# Patient Record
Sex: FEMALE | Race: BLACK OR AFRICAN AMERICAN | Employment: FULL TIME | URBAN - METROPOLITAN AREA
[De-identification: names, ages, dates, MRNs, and addresses within clinical notes are randomized per-mention and may not be internally consistent; named-entity substitution may affect disease eponyms.]

---

## 2019-07-07 ENCOUNTER — HOSPITAL ENCOUNTER (EMERGENCY)
Age: 19
Discharge: HOME | End: 2019-07-07
Payer: COMMERCIAL

## 2019-07-07 VITALS
HEART RATE: 73 BPM | BODY MASS INDEX: 30.52 KG/M2 | SYSTOLIC BLOOD PRESSURE: 134 MMHG | DIASTOLIC BLOOD PRESSURE: 66 MMHG | HEIGHT: 67 IN | TEMPERATURE: 98.8 F | RESPIRATION RATE: 16 BRPM | WEIGHT: 194.45 LBS | OXYGEN SATURATION: 98 %

## 2019-07-07 DIAGNOSIS — B96.89 BV (BACTERIAL VAGINOSIS): Primary | ICD-10-CM

## 2019-07-07 DIAGNOSIS — N76.0 BV (BACTERIAL VAGINOSIS): Primary | ICD-10-CM

## 2019-07-07 LAB
BACTERIA WET PREP: ABNORMAL
BACTERIA: ABNORMAL /HPF
BILIRUBIN URINE: NEGATIVE
BLOOD, URINE: NEGATIVE
CLARITY: ABNORMAL
CLUE CELLS: ABNORMAL
COLOR: YELLOW
EPITHELIAL CELLS WET PREP: ABNORMAL
EPITHELIAL CELLS, UA: 21 /HPF (ref 0–5)
GLUCOSE URINE: NEGATIVE MG/DL
HYALINE CASTS: 1 /LPF (ref 0–8)
KETONES, URINE: NEGATIVE MG/DL
LEUKOCYTE ESTERASE, URINE: NEGATIVE
MICROSCOPIC EXAMINATION: YES
NITRITE, URINE: NEGATIVE
PH UA: 7 (ref 5–8)
PROTEIN UA: NEGATIVE MG/DL
RBC UA: 1 /HPF (ref 0–4)
RBC WET PREP: ABNORMAL
SOURCE WET PREP: ABNORMAL
SPECIFIC GRAVITY UA: 1.03 (ref 1–1.03)
TRICHOMONAS PREP: ABNORMAL
URINE REFLEX TO CULTURE: YES
URINE TYPE: ABNORMAL
UROBILINOGEN, URINE: 0.2 E.U./DL
WBC UA: 10 /HPF (ref 0–5)
WBC WET PREP: ABNORMAL
YEAST WET PREP: ABNORMAL

## 2019-07-07 PROCEDURE — 87210 SMEAR WET MOUNT SALINE/INK: CPT

## 2019-07-07 PROCEDURE — 99283 EMERGENCY DEPT VISIT LOW MDM: CPT

## 2019-07-07 PROCEDURE — 87591 N.GONORRHOEAE DNA AMP PROB: CPT

## 2019-07-07 PROCEDURE — 87086 URINE CULTURE/COLONY COUNT: CPT

## 2019-07-07 PROCEDURE — 87491 CHLMYD TRACH DNA AMP PROBE: CPT

## 2019-07-07 PROCEDURE — 81001 URINALYSIS AUTO W/SCOPE: CPT

## 2019-07-07 RX ORDER — METRONIDAZOLE 500 MG/1
500 TABLET ORAL 2 TIMES DAILY
Qty: 14 TABLET | Refills: 0 | Status: SHIPPED | OUTPATIENT
Start: 2019-07-07 | End: 2019-07-14

## 2019-07-07 RX ORDER — FLUCONAZOLE 150 MG/1
150 TABLET ORAL ONCE
Qty: 2 TABLET | Refills: 0 | Status: SHIPPED | OUTPATIENT
Start: 2019-07-07 | End: 2019-07-07

## 2019-07-07 ASSESSMENT — ENCOUNTER SYMPTOMS
NAUSEA: 0
COLOR CHANGE: 0
VOMITING: 0

## 2019-07-07 NOTE — ED NOTES
AVS reviewed, VSS, pt verbalized understanding of f/u care and d.c instructions. Pt asked questions regarding her BP, stated HTN runs in her family and she has noticed when she eats certain foods she notices her pressure rising.  Discussed with pt foods to avoid and the importance of f/u with a PCP, pt verbalized understanding of f/u care     Edie Fraga RN  07/07/19 4098

## 2019-07-07 NOTE — ED PROVIDER NOTES
[address]  [facility] [address]  Dept: [phone]  Loc: [phone]  eMERGENCYdEPARTMENT eNCOUnter      Pt Name: Sol Gomez  MRN: [id number]  P[place] 2000  Date of evaluation: 7/7/2019  Provider:Veronika Uribe PA-C    CHIEF COMPLAINT       Chief Complaint   Patient presents with    Vaginal Itching     pt presents to ED for at least 3 weeks with thick white discharge. denies urinary symptoms. denies pain       HISTORY OF PRESENT ILLNESS  (Location/Symptom, Timing/Onset, Context/Setting, Quality, Duration,Modifying Factors, Severity.)   Sol Gomez is a 22 y.o. female who presents to the emergency department by private vehicle complaining of vaginal discharge x3 weeks. Patient denies any urinary frequency/urgency/hesitancy, dysuria. Denies any concern for STDs. No associate abdominal pain, nausea, vomiting. Patient states she has history of yeast infections following antibiotics. Significant other recently treated for a yeast infection, concerned she may have yeast infection at this time. No other complaint at this time. Nursing Notes were reviewedand agreed with or any disagreements were addressed in the HPI. REVIEW OF SYSTEMS    (2-9 systems for level 4, 10 or more for level 5)     Review of Systems   Constitutional: Negative for chills and fever. HENT: Negative. Gastrointestinal: Negative for nausea and vomiting. Genitourinary: Positive for vaginal discharge. Negative for decreased urine volume, urgency, vaginal bleeding and vaginal pain. Musculoskeletal: Negative for arthralgias and myalgias. Skin: Negative for color change, pallor and rash. Neurological: Negative for dizziness and light-headedness. Psychiatric/Behavioral: Negative for behavioral problems and confusion. Except as noted above the remainder of the review of systems was reviewed and negative.        PAST MEDICAL Patient discharged home in stable condition. PCP referral provided time of discharge. The patient tolerated their visit well. I have discussed the findings of today's workup with the patient and addressed the patient's questions and concerns. Important warning signs as well as new or worsening symptoms which would necessitate immediate return to the ED were discussed. The plan is to discharge from the ED at this time, and the patient is in stable condition. The patient acknowledged understanding is agreeable with this plan. CONSULTS:  None    PROCEDURES:  Procedures    FINAL IMPRESSION      1. BV (bacterial vaginosis)          DISPOSITION/PLAN   @[facility]    PATIENT REFERRED TO:  [facility] Emergency Department  [address] [id number]  [phone]  Go to   If symptoms worsen    [facility]) Pre-Services  [phone]  Schedule an appointment as soon as possible for a visit in 1 week  For follow up and reevaluation.       DISCHARGE MEDICATIONS:  New Prescriptions    FLUCONAZOLE (DIFLUCAN) 150 MG TABLET    Take 1 tablet by mouth once for 1 dose Take one and repeat in 1 week    METRONIDAZOLE (FLAGYL) 500 MG TABLET    Take 1 tablet by mouth 2 times daily for 7 days       (Please note that portions of this note were completed with a voice recognition program.  Efforts were made to edit the dictations but occasionally words are mis-transcribed.)    [address], PA-C          [address] [facility]  07/07/19 9643

## 2019-07-08 LAB — URINE CULTURE, ROUTINE: NORMAL

## 2019-07-09 LAB
C TRACH DNA GENITAL QL NAA+PROBE: NEGATIVE
N. GONORRHOEAE DNA: NEGATIVE

## 2019-07-28 ENCOUNTER — HOSPITAL ENCOUNTER (EMERGENCY)
Age: 19
Discharge: HOME | End: 2019-07-28
Payer: COMMERCIAL

## 2019-07-28 VITALS
TEMPERATURE: 98 F | HEART RATE: 81 BPM | RESPIRATION RATE: 16 BRPM | OXYGEN SATURATION: 97 % | SYSTOLIC BLOOD PRESSURE: 126 MMHG | DIASTOLIC BLOOD PRESSURE: 73 MMHG

## 2019-07-28 DIAGNOSIS — K13.70 ORAL MUCOSAL LESION: Primary | ICD-10-CM

## 2019-07-28 PROCEDURE — 99282 EMERGENCY DEPT VISIT SF MDM: CPT

## 2019-07-28 ASSESSMENT — ENCOUNTER SYMPTOMS
GASTROINTESTINAL NEGATIVE: 1
RESPIRATORY NEGATIVE: 1

## 2019-07-28 NOTE — ED TRIAGE NOTES
pt presents to ED for \" lump in where tonsils are but I don't have tonsils\"  x3 weeks. denies sore throat or recent illness. denies fever or chills. Throat assessed, color appropriate, small white lesion noted on R side.  Denies pain

## 2019-09-04 ENCOUNTER — OFFICE VISIT (OUTPATIENT)
Dept: PRIMARY CARE CLINIC | Age: 19
End: 2019-09-04
Payer: COMMERCIAL

## 2019-09-04 VITALS
SYSTOLIC BLOOD PRESSURE: 100 MMHG | BODY MASS INDEX: 31.08 KG/M2 | WEIGHT: 198 LBS | HEIGHT: 67 IN | DIASTOLIC BLOOD PRESSURE: 70 MMHG | HEART RATE: 100 BPM

## 2019-09-04 DIAGNOSIS — R63.8 WEIGHT DISORDER: Primary | ICD-10-CM

## 2019-09-04 DIAGNOSIS — R00.0 TACHYCARDIA: ICD-10-CM

## 2019-09-04 DIAGNOSIS — Z23 NEED FOR HPV VACCINE: ICD-10-CM

## 2019-09-04 DIAGNOSIS — Z23 FLU VACCINE NEED: ICD-10-CM

## 2019-09-04 PROCEDURE — G8427 DOCREV CUR MEDS BY ELIG CLIN: HCPCS | Performed by: INTERNAL MEDICINE

## 2019-09-04 PROCEDURE — 1036F TOBACCO NON-USER: CPT | Performed by: INTERNAL MEDICINE

## 2019-09-04 PROCEDURE — 99203 OFFICE O/P NEW LOW 30 MIN: CPT | Performed by: INTERNAL MEDICINE

## 2019-09-04 PROCEDURE — G8417 CALC BMI ABV UP PARAM F/U: HCPCS | Performed by: INTERNAL MEDICINE

## 2019-09-04 PROCEDURE — 90651 9VHPV VACCINE 2/3 DOSE IM: CPT | Performed by: INTERNAL MEDICINE

## 2019-09-04 PROCEDURE — 90471 IMMUNIZATION ADMIN: CPT | Performed by: INTERNAL MEDICINE

## 2019-09-04 ASSESSMENT — ENCOUNTER SYMPTOMS
EYES NEGATIVE: 1
DIARRHEA: 0
WHEEZING: 0
GASTROINTESTINAL NEGATIVE: 1
ABDOMINAL DISTENTION: 0
CONSTIPATION: 0
SHORTNESS OF BREATH: 0
CHEST TIGHTNESS: 0
BLOOD IN STOOL: 0
COUGH: 0
ABDOMINAL PAIN: 0

## 2019-09-04 ASSESSMENT — PATIENT HEALTH QUESTIONNAIRE - PHQ9
SUM OF ALL RESPONSES TO PHQ9 QUESTIONS 1 & 2: 0
1. LITTLE INTEREST OR PLEASURE IN DOING THINGS: 0
SUM OF ALL RESPONSES TO PHQ QUESTIONS 1-9: 0
SUM OF ALL RESPONSES TO PHQ QUESTIONS 1-9: 0
2. FEELING DOWN, DEPRESSED OR HOPELESS: 0

## 2019-10-03 ENCOUNTER — OFFICE VISIT (OUTPATIENT)
Dept: PRIMARY CARE CLINIC | Age: 19
End: 2019-10-03
Payer: COMMERCIAL

## 2019-10-03 VITALS
DIASTOLIC BLOOD PRESSURE: 80 MMHG | SYSTOLIC BLOOD PRESSURE: 122 MMHG | HEART RATE: 82 BPM | BODY MASS INDEX: 29.51 KG/M2 | WEIGHT: 188 LBS | HEIGHT: 67 IN

## 2019-10-03 DIAGNOSIS — Z20.2 POSSIBLE EXPOSURE TO STD: ICD-10-CM

## 2019-10-03 DIAGNOSIS — R00.0 TACHYCARDIA: ICD-10-CM

## 2019-10-03 DIAGNOSIS — R63.4 WEIGHT LOSS: ICD-10-CM

## 2019-10-03 DIAGNOSIS — Z23 FLU VACCINE NEED: ICD-10-CM

## 2019-10-03 LAB
BILIRUBIN URINE: ABNORMAL
BLOOD, URINE: NEGATIVE
CLARITY: CLEAR
COLOR: YELLOW
COMMENT UA: NORMAL
EPITHELIAL CELLS, UA: NORMAL /HPF
GLUCOSE URINE: NEGATIVE MG/DL
HEPATITIS B SURFACE ANTIGEN INTERPRETATION: NORMAL
HEPATITIS C ANTIBODY INTERPRETATION: NORMAL
KETONES, URINE: 40 MG/DL
LEUKOCYTE ESTERASE, URINE: NEGATIVE
MICROSCOPIC EXAMINATION: YES
NITRITE, URINE: NEGATIVE
PH UA: 5.5 (ref 5–8)
PROTEIN UA: ABNORMAL MG/DL
RBC UA: NORMAL /HPF (ref 0–2)
SPECIFIC GRAVITY UA: >1.03 (ref 1–1.03)
URINE TYPE: ABNORMAL
UROBILINOGEN, URINE: 0.2 E.U./DL
WBC UA: NORMAL /HPF (ref 0–5)

## 2019-10-03 PROCEDURE — 99213 OFFICE O/P EST LOW 20 MIN: CPT | Performed by: INTERNAL MEDICINE

## 2019-10-03 PROCEDURE — G8427 DOCREV CUR MEDS BY ELIG CLIN: HCPCS | Performed by: INTERNAL MEDICINE

## 2019-10-03 PROCEDURE — G8484 FLU IMMUNIZE NO ADMIN: HCPCS | Performed by: INTERNAL MEDICINE

## 2019-10-03 PROCEDURE — G8417 CALC BMI ABV UP PARAM F/U: HCPCS | Performed by: INTERNAL MEDICINE

## 2019-10-03 PROCEDURE — 1036F TOBACCO NON-USER: CPT | Performed by: INTERNAL MEDICINE

## 2019-10-03 ASSESSMENT — ENCOUNTER SYMPTOMS
CONSTIPATION: 0
BLOOD IN STOOL: 0
DIARRHEA: 0
CHEST TIGHTNESS: 0
EYES NEGATIVE: 1
ABDOMINAL DISTENTION: 0
COUGH: 0
SHORTNESS OF BREATH: 0
GASTROINTESTINAL NEGATIVE: 1
ABDOMINAL PAIN: 0
WHEEZING: 0

## 2019-10-04 LAB
C. TRACHOMATIS DNA ,URINE: NEGATIVE
HIV AG/AB: NORMAL
HIV ANTIGEN: NORMAL
HIV-1 ANTIBODY: NORMAL
HIV-2 AB: NORMAL
N. GONORRHOEAE DNA, URINE: NEGATIVE
TOTAL SYPHILLIS IGG/IGM: NORMAL

## 2019-10-23 ENCOUNTER — NURSE TRIAGE (OUTPATIENT)
Dept: OTHER | Facility: CLINIC | Age: 19
End: 2019-10-23

## 2019-11-22 ENCOUNTER — OFFICE VISIT (OUTPATIENT)
Dept: PRIMARY CARE CLINIC | Age: 19
End: 2019-11-22
Payer: COMMERCIAL

## 2019-11-22 VITALS
BODY MASS INDEX: 29.29 KG/M2 | HEIGHT: 67 IN | DIASTOLIC BLOOD PRESSURE: 79 MMHG | WEIGHT: 186.6 LBS | HEART RATE: 92 BPM | SYSTOLIC BLOOD PRESSURE: 127 MMHG

## 2019-11-22 DIAGNOSIS — R10.84 GENERALIZED ABDOMINAL PAIN: Primary | ICD-10-CM

## 2019-11-22 DIAGNOSIS — K59.09 OTHER CONSTIPATION: ICD-10-CM

## 2019-11-22 DIAGNOSIS — R10.84 GENERALIZED ABDOMINAL PAIN: ICD-10-CM

## 2019-11-22 LAB
A/G RATIO: 1.5 (ref 1.1–2.2)
ALBUMIN SERPL-MCNC: 4.6 G/DL (ref 3.4–5)
ALP BLD-CCNC: 49 U/L (ref 40–129)
ALT SERPL-CCNC: <5 U/L (ref 10–40)
AMYLASE: 55 U/L (ref 25–115)
ANION GAP SERPL CALCULATED.3IONS-SCNC: 13 MMOL/L (ref 3–16)
AST SERPL-CCNC: 9 U/L (ref 15–37)
BASOPHILS ABSOLUTE: 0 K/UL (ref 0–0.2)
BASOPHILS RELATIVE PERCENT: 0.6 %
BILIRUB SERPL-MCNC: 0.4 MG/DL (ref 0–1)
BILIRUBIN URINE: NEGATIVE
BLOOD, URINE: NEGATIVE
BUN BLDV-MCNC: 17 MG/DL (ref 7–20)
CALCIUM SERPL-MCNC: 9.5 MG/DL (ref 8.3–10.6)
CHLORIDE BLD-SCNC: 103 MMOL/L (ref 99–110)
CLARITY: CLEAR
CO2: 21 MMOL/L (ref 21–32)
COLOR: YELLOW
CREAT SERPL-MCNC: 0.7 MG/DL (ref 0.6–1.1)
EOSINOPHILS ABSOLUTE: 0 K/UL (ref 0–0.6)
EOSINOPHILS RELATIVE PERCENT: 0.9 %
GFR AFRICAN AMERICAN: >60
GFR NON-AFRICAN AMERICAN: >60
GLOBULIN: 3 G/DL
GLUCOSE BLD-MCNC: 82 MG/DL (ref 70–99)
GLUCOSE URINE: NEGATIVE MG/DL
HCG(URINE) PREGNANCY TEST: NEGATIVE
HCT VFR BLD CALC: 38 % (ref 36–48)
HEMOGLOBIN: 12.5 G/DL (ref 12–16)
KETONES, URINE: 40 MG/DL
LEUKOCYTE ESTERASE, URINE: NEGATIVE
LIPASE: 24 U/L (ref 13–60)
LYMPHOCYTES ABSOLUTE: 1.8 K/UL (ref 1–5.1)
LYMPHOCYTES RELATIVE PERCENT: 36.7 %
MCH RBC QN AUTO: 28.3 PG (ref 26–34)
MCHC RBC AUTO-ENTMCNC: 32.9 G/DL (ref 31–36)
MCV RBC AUTO: 86 FL (ref 80–100)
MICROSCOPIC EXAMINATION: ABNORMAL
MONOCYTES ABSOLUTE: 0.4 K/UL (ref 0–1.3)
MONOCYTES RELATIVE PERCENT: 8.3 %
NEUTROPHILS ABSOLUTE: 2.6 K/UL (ref 1.7–7.7)
NEUTROPHILS RELATIVE PERCENT: 53.5 %
NITRITE, URINE: NEGATIVE
PDW BLD-RTO: 13.7 % (ref 12.4–15.4)
PH UA: 6 (ref 5–8)
PLATELET # BLD: 285 K/UL (ref 135–450)
PMV BLD AUTO: 9.1 FL (ref 5–10.5)
POTASSIUM SERPL-SCNC: 4.7 MMOL/L (ref 3.5–5.1)
PROTEIN UA: NEGATIVE MG/DL
RBC # BLD: 4.41 M/UL (ref 4–5.2)
SODIUM BLD-SCNC: 137 MMOL/L (ref 136–145)
SPECIFIC GRAVITY UA: >1.03 (ref 1–1.03)
TOTAL PROTEIN: 7.6 G/DL (ref 6.4–8.2)
URINE TYPE: ABNORMAL
UROBILINOGEN, URINE: 0.2 E.U./DL
WBC # BLD: 4.8 K/UL (ref 4–11)

## 2019-11-22 PROCEDURE — 1036F TOBACCO NON-USER: CPT | Performed by: INTERNAL MEDICINE

## 2019-11-22 PROCEDURE — G8427 DOCREV CUR MEDS BY ELIG CLIN: HCPCS | Performed by: INTERNAL MEDICINE

## 2019-11-22 PROCEDURE — G8417 CALC BMI ABV UP PARAM F/U: HCPCS | Performed by: INTERNAL MEDICINE

## 2019-11-22 PROCEDURE — G8484 FLU IMMUNIZE NO ADMIN: HCPCS | Performed by: INTERNAL MEDICINE

## 2019-11-22 PROCEDURE — 99214 OFFICE O/P EST MOD 30 MIN: CPT | Performed by: INTERNAL MEDICINE

## 2019-11-22 RX ORDER — OMEPRAZOLE 20 MG/1
20 CAPSULE, DELAYED RELEASE ORAL
Qty: 30 CAPSULE | Refills: 0 | Status: SHIPPED | OUTPATIENT
Start: 2019-11-22 | End: 2020-12-02

## 2019-11-22 RX ORDER — DOCUSATE SODIUM 100 MG/1
100 CAPSULE, LIQUID FILLED ORAL 2 TIMES DAILY PRN
Qty: 60 CAPSULE | Refills: 0 | Status: SHIPPED | OUTPATIENT
Start: 2019-11-22 | End: 2020-12-02

## 2019-11-22 ASSESSMENT — ENCOUNTER SYMPTOMS
COUGH: 0
BLOOD IN STOOL: 0
NAUSEA: 0
CONSTIPATION: 1
ABDOMINAL PAIN: 1
DIARRHEA: 0
SHORTNESS OF BREATH: 0
ABDOMINAL DISTENTION: 0
CHEST TIGHTNESS: 0
WHEEZING: 0
EYES NEGATIVE: 1

## 2020-10-13 ENCOUNTER — TELEPHONE (OUTPATIENT)
Dept: PRIMARY CARE CLINIC | Age: 20
End: 2020-10-13

## 2020-10-13 NOTE — TELEPHONE ENCOUNTER
----- Message from [facility] sent at 10/12/2020  2:28 PM EDT -----  Subject: Appointment Request    Reason for Call: Routine Physical Exam with PAP    QUESTIONS  Type of Appointment? Established Patient  Reason for appointment request? No appointments available during search  Additional Information for Provider? nbr is needing to come in and get a   pap.   br had baby 2 month ago and ever since she has had a odor in her private   area - screened green  ---------------------------------------------------------------------------  --------------  CALL BACK INFO  What is the best way for the office to contact you? OK to leave message on   voicemail  Preferred Call Back Phone Number? [phone]  ---------------------------------------------------------------------------  --------------  SCRIPT ANSWERS  Relationship to Patient? Self  Appointment reason? Well Care/Follow Ups  Select a Well Care/Follow Ups appointment reason? Adult Physical Exam   [Medicare Annual Wellness   AWV   PAP   Pelvic]  (If the patient is Medicare / 65+ ask this question) Are you requesting a   Medicare Annual Wellness Visit? No  (If the patient is female   ask this question) Are you requesting a pap smear with your physical   exam? Yes  (Is the patient requesting their annual physical and does not need PAP or   AWV per above)? No  Have you been diagnosed with   tested for   or told that you are suspected of having COVID-19 (Coronavirus)? Yes  Did your symptoms begin or have you been tested for COVID-19 in the last   10 days? No  Have you had a fever or taken medication to treat a fever within the past   3 days? No  Have you had a cough   shortness of breath or flu-like symptoms within the past 3 days? No  Do you currently have flu-like symptoms including fever or chills   cough   shortness of breath   or difficulty breathing   or new loss of taste or smell?  No  (Service Expert  click yes below to proceed with [employer] As Usual Scheduling)?  Yes

## 2020-12-02 ENCOUNTER — HOSPITAL ENCOUNTER (EMERGENCY)
Age: 20
Discharge: HOME | End: 2020-12-02
Attending: EMERGENCY MEDICINE
Payer: COMMERCIAL

## 2020-12-02 ENCOUNTER — APPOINTMENT (OUTPATIENT)
Dept: GENERAL RADIOLOGY | Age: 20
End: 2020-12-02
Payer: COMMERCIAL

## 2020-12-02 VITALS
BODY MASS INDEX: 33.2 KG/M2 | OXYGEN SATURATION: 100 % | WEIGHT: 212 LBS | SYSTOLIC BLOOD PRESSURE: 130 MMHG | HEART RATE: 104 BPM | DIASTOLIC BLOOD PRESSURE: 85 MMHG | TEMPERATURE: 98.4 F | RESPIRATION RATE: 18 BRPM

## 2020-12-02 LAB
ANION GAP SERPL CALCULATED.3IONS-SCNC: 9 MMOL/L (ref 3–16)
BASOPHILS ABSOLUTE: 0 K/UL (ref 0–0.2)
BASOPHILS RELATIVE PERCENT: 0.7 %
BUN BLDV-MCNC: 16 MG/DL (ref 7–20)
CALCIUM SERPL-MCNC: 9 MG/DL (ref 8.3–10.6)
CHLORIDE BLD-SCNC: 104 MMOL/L (ref 99–110)
CO2: 23 MMOL/L (ref 21–32)
CREAT SERPL-MCNC: 0.6 MG/DL (ref 0.6–1.1)
EOSINOPHILS ABSOLUTE: 0 K/UL (ref 0–0.6)
EOSINOPHILS RELATIVE PERCENT: 0.8 %
GFR AFRICAN AMERICAN: >60
GFR NON-AFRICAN AMERICAN: >60
GLUCOSE BLD-MCNC: 96 MG/DL (ref 70–99)
HCG QUALITATIVE: NEGATIVE
HCT VFR BLD CALC: 37.8 % (ref 36–48)
HEMOGLOBIN: 12.2 G/DL (ref 12–16)
LYMPHOCYTES ABSOLUTE: 1.9 K/UL (ref 1–5.1)
LYMPHOCYTES RELATIVE PERCENT: 33.6 %
MCH RBC QN AUTO: 28.2 PG (ref 26–34)
MCHC RBC AUTO-ENTMCNC: 32.3 G/DL (ref 31–36)
MCV RBC AUTO: 87.2 FL (ref 80–100)
MONOCYTES ABSOLUTE: 0.5 K/UL (ref 0–1.3)
MONOCYTES RELATIVE PERCENT: 8.5 %
NEUTROPHILS ABSOLUTE: 3.2 K/UL (ref 1.7–7.7)
NEUTROPHILS RELATIVE PERCENT: 56.4 %
PDW BLD-RTO: 13.9 % (ref 12.4–15.4)
PLATELET # BLD: 296 K/UL (ref 135–450)
PMV BLD AUTO: 8.1 FL (ref 5–10.5)
POTASSIUM SERPL-SCNC: 3.9 MMOL/L (ref 3.5–5.1)
RBC # BLD: 4.33 M/UL (ref 4–5.2)
SODIUM BLD-SCNC: 136 MMOL/L (ref 136–145)
TROPONIN: <0.01 NG/ML
WBC # BLD: 5.6 K/UL (ref 4–11)

## 2020-12-02 PROCEDURE — 84484 ASSAY OF TROPONIN QUANT: CPT

## 2020-12-02 PROCEDURE — 80048 BASIC METABOLIC PNL TOTAL CA: CPT

## 2020-12-02 PROCEDURE — 84703 CHORIONIC GONADOTROPIN ASSAY: CPT

## 2020-12-02 PROCEDURE — 71046 X-RAY EXAM CHEST 2 VIEWS: CPT

## 2020-12-02 PROCEDURE — 99283 EMERGENCY DEPT VISIT LOW MDM: CPT

## 2020-12-02 PROCEDURE — 93005 ELECTROCARDIOGRAM TRACING: CPT | Performed by: EMERGENCY MEDICINE

## 2020-12-02 PROCEDURE — 36415 COLL VENOUS BLD VENIPUNCTURE: CPT

## 2020-12-02 PROCEDURE — 85025 COMPLETE CBC W/AUTO DIFF WBC: CPT

## 2020-12-02 ASSESSMENT — PAIN SCALES - GENERAL: PAINLEVEL_OUTOF10: 0

## 2020-12-03 LAB
EKG ATRIAL RATE: 98 BPM
EKG DIAGNOSIS: NORMAL
EKG P AXIS: 50 DEGREES
EKG P-R INTERVAL: 120 MS
EKG Q-T INTERVAL: 350 MS
EKG QRS DURATION: 92 MS
EKG QTC CALCULATION (BAZETT): 446 MS
EKG R AXIS: 12 DEGREES
EKG T AXIS: 15 DEGREES
EKG VENTRICULAR RATE: 98 BPM

## 2020-12-03 PROCEDURE — 93010 ELECTROCARDIOGRAM REPORT: CPT | Performed by: INTERNAL MEDICINE

## 2020-12-03 NOTE — ED PROVIDER NOTES
Minutes per session: Not on file    Stress: Not on file   Relationships    Social connections     Talks on phone: Not on file     Gets together: Not on file     Attends [employer] service: Not on file     Active member of club or organization: Not on file     Attends meetings of clubs or organizations: Not on file     Relationship status: Not on file    Intimate partner violence     Fear of current or ex partner: Not on file     Emotionally abused: Not on file     Physically abused: Not on file     Forced sexual activity: Not on file   Other Topics Concern    Not on file   Social History Narrative    Not on file     No current facility-administered medications for this encounter. No current outpatient medications on file. No Known Allergies    REVIEW OF SYSTEMS  10 systems reviewed, pertinent positives per HPI otherwise noted to be negative. PHYSICAL EXAM  /85   Pulse 104   Temp 98.4 °F (36.9 °C) (Oral)   Resp 18   Wt 212 lb (96.2 kg)   LMP 11/15/2020   SpO2 100%   BMI 33.20 kg/m²   GENERAL APPEARANCE: Awake and alert. Cooperative. No acute distress. HEAD: Normocephalic. Atraumatic. EYES: PERRL. EOM's grossly intact. ENT: Mucous membranes are moist.   NECK: Supple. HEART: RRR. CHEST/LUNGS: Chest atraumatic, nontender, respirations unlabored. CTAB. Good air exchange. Speaking comfortably in full sentences. BACK: No midline spinal tenderness or step-off. ABDOMEN: Soft. Non-distended. Non-tender. No guarding or rebound. Normal bowel sounds. EXTREMITIES: No peripheral edema. Moves all extremities equally. All extremities neurovascularly intact. RECTAL/: Deferred  SKIN: Warm and dry. No acute rashes. NEUROLOGICAL: Alert and oriented. CN 2-12 intact, No gross facial drooping. Strength 5/5, sensation intact. Normal coordination. Gait normal.   PSYCHIATRIC: Normal mood and affect. LABS  I have reviewed all labs for this visit.    Results for orders placed or performed during the hospital encounter of 69/28/71   Basic Metabolic Panel   Result Value Ref Range    Sodium 136 136 - 145 mmol/L    Potassium 3.9 3.5 - 5.1 mmol/L    Chloride 104 99 - 110 mmol/L    CO2 23 21 - 32 mmol/L    Anion Gap 9 3 - 16    Glucose 96 70 - 99 mg/dL    BUN 16 7 - 20 mg/dL    CREATININE 0.6 0.6 - 1.1 mg/dL    GFR Non-African American >60 >60    GFR African American >60 >60    Calcium 9.0 8.3 - 10.6 mg/dL   Troponin   Result Value Ref Range    Troponin <0.01 <0.01 ng/mL   CBC Auto Differential   Result Value Ref Range    WBC 5.6 4.0 - 11.0 K/uL    RBC 4.33 4.00 - 5.20 M/uL    Hemoglobin 12.2 12.0 - 16.0 g/dL    Hematocrit 37.8 36.0 - 48.0 %    MCV 87.2 80.0 - 100.0 fL    MCH 28.2 26.0 - 34.0 pg    MCHC 32.3 31.0 - 36.0 g/dL    RDW 13.9 12.4 - 15.4 %    Platelets 488 390 - 557 K/uL    MPV 8.1 5.0 - 10.5 fL    Neutrophils % 56.4 %    Lymphocytes % 33.6 %    Monocytes % 8.5 %    Eosinophils % 0.8 %    Basophils % 0.7 %    Neutrophils Absolute 3.2 1.7 - 7.7 K/uL    Lymphocytes Absolute 1.9 1.0 - 5.1 K/uL    Monocytes Absolute 0.5 0.0 - 1.3 K/uL    Eosinophils Absolute 0.0 0.0 - 0.6 K/uL    Basophils Absolute 0.0 0.0 - 0.2 K/uL   HCG Qualitative, Serum   Result Value Ref Range    hCG Qual Negative Detects HCG level >10 MIU/mL   EKG 12 Lead   Result Value Ref Range    Ventricular Rate 98 BPM    Atrial Rate 98 BPM    P-R Interval 120 ms    QRS Duration 92 ms    Q-T Interval 350 ms    QTc Calculation (Bazett) 446 ms    P Axis 50 degrees    R Axis 12 degrees    T Axis 15 degrees    Diagnosis       Normal sinus rhythmRSR' or QR pattern in V1 suggests right ventricular conduction delayBorderline ECG     EKG shows normal sinus rhythm, heart rate 98, normal QRS and intervals, no significant ST elevation or depressions noted    RADIOLOGY  X-RAYS:  I have reviewed radiologic plain film image(s). ALL OTHER NON-PLAIN FILM IMAGES SUCH AS CT, ULTRASOUND AND MRI HAVE BEEN READ BY THE RADIOLOGIST.   XR CHEST (2 VW)   Final Result   No acute cardiopulmonary disease. ED COURSE/MDM  Patient seen and evaluated. Due to the patient is tachycardic in the low 100s when I am speaking with the patient out of the room I have noted her heart rate is in the mid to low 90s. No shortness of breath and her lungs are clear. She does not appear in any distress I have a low concern for ACS or PE at this time but will give her cardiology follow-up considering that she is been having this pain on and off for many months although it is likely more musculoskeletal but it does seem a little atypical for musculoskeletal as well. I estimate there is LOW risk for ACUTE CORONARY SYNDROME, INTRACRANIAL HEMORRHAGE, MALIGNANT DYSRHYTHMIA or HYPERTENSION, PULMONARY EMBOLISM, SEPSIS, SUBARACHNOID HEMORRHAGE, SUBDURAL HEMATOMA, STROKE, or THORACIC AORTIC DISSECTION, thus I consider the discharge disposition reasonable. Jennifer Winn and I have discussed the diagnosis and risks, and we agree with discharging home to follow-up with their primary doctor. We also discussed returning to the Emergency Department immediately if new or worsening symptoms occur. We have discussed the symptoms which are most concerning (e.g., bloody sputum, fever, worsening pain or shortness of breath, vomiting, weakness) that necessitate immediate return. All diagnostic tests reviewed and results discussed with patient. Plan of care discussed with patient. Patient in agreement with plan. CLINICAL IMPRESSION  1. Chest pain, unspecified type        Blood pressure 130/85, pulse 104, temperature 98.4 °F (36.9 °C), temperature source Oral, resp. rate 18, weight 212 lb (96.2 kg), last menstrual period 11/15/2020, SpO2 100 %. DISPOSITION  Jennifer Winn was discharged to home in stable condition.      This chart was generated in part by using Dragon Dictation system and may contain errors related to that system including errors in grammar, punctuation, and spelling, as well as words and phrases that may be inappropriate. When dictating, effort is made to correct spelling/grammar errors. If there are any questions or concerns please feel free to contact the dictating provider for clarification.      Angel Amaya DO  ATTENDING, [address] [place] [address],   12/02/20 1764

## 2020-12-07 ENCOUNTER — OFFICE VISIT (OUTPATIENT)
Dept: CARDIOLOGY CLINIC | Age: 20
End: 2020-12-07
Payer: COMMERCIAL

## 2020-12-07 VITALS
HEIGHT: 67 IN | SYSTOLIC BLOOD PRESSURE: 132 MMHG | BODY MASS INDEX: 33.27 KG/M2 | DIASTOLIC BLOOD PRESSURE: 76 MMHG | OXYGEN SATURATION: 99 % | WEIGHT: 212 LBS | TEMPERATURE: 97.7 F | HEART RATE: 109 BPM

## 2020-12-07 PROCEDURE — G8427 DOCREV CUR MEDS BY ELIG CLIN: HCPCS | Performed by: INTERNAL MEDICINE

## 2020-12-07 PROCEDURE — 99204 OFFICE O/P NEW MOD 45 MIN: CPT | Performed by: INTERNAL MEDICINE

## 2020-12-07 PROCEDURE — G8417 CALC BMI ABV UP PARAM F/U: HCPCS | Performed by: INTERNAL MEDICINE

## 2020-12-07 PROCEDURE — 1036F TOBACCO NON-USER: CPT | Performed by: INTERNAL MEDICINE

## 2020-12-07 PROCEDURE — G8484 FLU IMMUNIZE NO ADMIN: HCPCS | Performed by: INTERNAL MEDICINE

## 2020-12-07 NOTE — PROGRESS NOTES
[address]  Cardiac Consult     Referring Provider:  Fredie Hatchet, MD     Chief Complaint   Patient presents with    New Patient     chest tightness        History of Present Illness:   23 y.o. female with no past medical history seen for chest pain. She has been getting tightness in her left chest for a few months. It seems to be exacerbated by eating. It became worse a couple weeks ago when she began eating \" food\". She describes this as very spicy. She felt that it may be related to the food so she stopped eating that type of food and feels better. She went for I 2 mile walk and \"felt great\". She was seen in ER 12/2 for this and evaluation negative. She was sent here for f/u. She has not had a PCP and has one that she will be seeing for the first time in 2 weeks. Past Medical History:  No past medical issues    Surgical History:   has a past surgical history that includes Tonsillectomy. Social History:  Social History     Tobacco Use    Smoking status: Never Smoker    Smokeless tobacco: Never Used   Substance Use Topics    Alcohol use: No        Family History:  family history includes Hypertension in her father. Allergies:  Patient has no known allergies. Home Medications:  Prior to Visit Medications    Not on File   None    [x] Medications and dosages reviewed.     ROS:  [x]Full ROS obtained and negative except as mentioned in HPI      Physical Examination:    Vitals:    12/07/20 1511   BP: 132/76   Site: Right Upper Arm   Position: Sitting   Cuff Size: Large Adult   Pulse: 109   Temp: 97.7 °F (36.5 °C)   SpO2: 99%   Weight: 212 lb (96.2 kg)   Height: 5' 7\" (1.702 m)        · GENERAL: Well developed, well nourished, No acute distress  · NEUROLOGICAL: Alert and oriented  · PSYCH: Calm affect  · SKIN: Warm and dry, No visible rash,   · EYES: Pupils equal and round, Sclera non-icteric,   · HENT:  External ears and nose unremarkable, mucus membranes moist  · MUSCULOSKELETAL: Normal cephalic, neck supple  · CAROTID: Normal upstroke, no bruits  · CARDIAC: JVP normal, Normal PMI, regular rate and rhythm, normal S1S2, no murmur, rub, or gallop  · RESPIRATORY: Normal respiratory effort, clear to auscultation bilaterally  · EXTREMITIES: No edema  · GASTROINTESTINAL: normal bowel sounds, soft, non-tender, No hepatomegaly     EKG: (tracing reviewed) Sinus, RV conduction delay (Normal for age)    Trop <0.01 in ER    Assessment:     Chest Pain:  Unlikely cardiac  No cardiac risks and 23years old  Occurs after meals and with spicy foods. Likely GI.   Suggests prilosec 20 mg/day  F/u with new PCP  See me prn if symptoms persist    Plan:  Prilosec 20 mg  F/u PCP  F/u me prn    Thank you for allowing me to participate in the care of this individual.      Turner Dowd M.D., [facility]

## 2020-12-07 NOTE — PATIENT INSTRUCTIONS
Try Prilosec 20 mg daily for indigestion/acid reflux  Follow up with PCP on 12/28  Follow up with Dr Donnie Monroe as needed

## 2025-02-13 ENCOUNTER — HOSPITAL ENCOUNTER (EMERGENCY)
Age: 25
Discharge: HOME | End: 2025-02-13
Attending: EMERGENCY MEDICINE
Payer: COMMERCIAL

## 2025-02-13 VITALS
HEART RATE: 89 BPM | SYSTOLIC BLOOD PRESSURE: 124 MMHG | HEIGHT: 67 IN | TEMPERATURE: 97.9 F | OXYGEN SATURATION: 94 % | BODY MASS INDEX: 32.39 KG/M2 | DIASTOLIC BLOOD PRESSURE: 78 MMHG | RESPIRATION RATE: 16 BRPM | WEIGHT: 206.35 LBS

## 2025-02-13 DIAGNOSIS — S60.459A FOREIGN BODY OF FINGER: Primary | ICD-10-CM

## 2025-02-13 PROCEDURE — 99282 EMERGENCY DEPT VISIT SF MDM: CPT

## 2025-02-13 ASSESSMENT — PAIN SCALES - GENERAL: PAINLEVEL_OUTOF10: 0

## 2025-02-13 ASSESSMENT — PAIN - FUNCTIONAL ASSESSMENT: PAIN_FUNCTIONAL_ASSESSMENT: 0-10

## 2025-02-14 NOTE — ED PROVIDER NOTES
[facility] EMERGENCY DEPARTMENT  EMERGENCY DEPARTMENT ENCOUNTER      Pt Name: Angie Montes  MRN: [id number]  Birthdate 2000  Date of evaluation: 2/13/2025  Provider: TAIWO APONTE DO    CHIEF COMPLAINT  Chief Complaint   Patient presents with    Ring Removal     Pt presents with a ring stuck on her rt ring finger          This patient is at risk for a communicable infection.  Therefore, personal protection equipment consisting of a mask was worn for the exam.    HPI  Angie Montes is a 24 y.o. female who presents with a ring stuck on her right ring finger been present for few days.  It started swelling up and is painful.  She denies any paresthesias or weakness.  ?  REVIEW OF SYSTEMS  All systems negative except as noted in the HPI.  Reviewed Nurses' notes and concur.    No LMP recorded.    PAST MEDICAL HISTORY  No past medical history on file.    FAMILY HISTORY  Family History   Problem Relation Age of Onset    Hypertension Father        SOCIAL HISTORY   reports that she has never smoked. She has never used smokeless tobacco. She reports that she does not drink alcohol and does not use drugs.    SURGICAL HISTORY  Past Surgical History:   Procedure Laterality Date    TONSILLECTOMY         CURRENT MEDICATIONS      ALLERGIES  No Known Allergies    Tetanus vaccination status reviewed: tetanus re-vaccination not indicated.      PHYSICAL EXAM  VITAL SIGNS: /78   Pulse 89   Temp 97.9 °F (36.6 °C) (Oral)   Resp 16   Ht 1.702 m (5' 7\")   Wt 93.6 kg (206 lb 5.6 oz)   SpO2 94%   BMI 32.32 kg/m²   Constitutional: Well-developed, well-nourished, appears normal, nontoxic, activity: Resting on the cart, no obvious pain, speaking in full senses, does not appear ill or toxic.  Extremities:  neurovascular intact, no deformity, moderate right ring finger swelling, mild right ring finger erythema, no lacerations noted, no amputations, no cyanosis, no mottling, no ecchymosis, mild tenderness of

## 2025-02-14 NOTE — ED NOTES
Patient DCed from ED at this time. Discussed AVS and follow up. They verbalized understanding. Reinforced that should symptoms persist or worsen to return to the ED. They verbalized understanding. Patient ambulated out of ED. RN thanked patient for choosing [facility].

## 2025-02-14 NOTE — DISCHARGE INSTRUCTIONS
Elevate your right hand and apply ice for 30 minutes 4 times a day as needed for swelling.    You may take Tylenol (acetaminophen) and/or Motrin (ibuprofen) with the medications that are prescribed for you for pain or fever, if you are permitted to take these medications. Please follow package directions for the appropriate dosing and frequency.